# Patient Record
Sex: MALE | Race: WHITE
[De-identification: names, ages, dates, MRNs, and addresses within clinical notes are randomized per-mention and may not be internally consistent; named-entity substitution may affect disease eponyms.]

---

## 2019-07-18 ENCOUNTER — HOSPITAL ENCOUNTER (EMERGENCY)
Dept: HOSPITAL 56 - MW.ED | Age: 29
Discharge: HOME | End: 2019-07-18
Payer: COMMERCIAL

## 2019-07-18 DIAGNOSIS — F17.210: ICD-10-CM

## 2019-07-18 DIAGNOSIS — K04.7: Primary | ICD-10-CM

## 2019-07-18 LAB
CHLORIDE SERPL-SCNC: 104 MMOL/L (ref 98–107)
SODIUM SERPL-SCNC: 142 MMOL/L (ref 136–148)

## 2019-07-18 NOTE — CR
INDICATION:



Pain and swelling



TECHNIQUE:



Soft tissue neck 2 view.



COMPARISON:



None.



FINDINGS:



No prevertebral soft tissue swelling. Epiglottis appears slightly rotated, 

probably within normal limits. Upper lungs unremarkable. Vertebral bodies 

maintain anatomic alignment. No tonsillar enlargement. 



IMPRESSION:



Mild prominence of the epiglottis, favor secondary to slight rotation. 

Clinical correlation. Otherwise, unremarkable soft tissue neck.



Dictated by Miles Kennedy MD @ Jul 18 2019  5:14AM



Signed by Dr. Miles Kennedy @ Jul 18 2019  5:16AM

## 2019-07-18 NOTE — EDM.PDOC
ED HPI GENERAL MEDICAL PROBLEM





- General


Chief Complaint: ENT Problem


Stated Complaint: MOUTH INFECTION


Time Seen by Provider: 07/18/19 05:28





- History of Present Illness


INITIAL COMMENTS - FREE TEXT/NARRATIVE: 





HISTORY AND PHYSICAL:





History of present illness:


Patient's 29-year-old white male presents with a concern of right jaw swelling 

and dental pain he denies fever chills nausea vomiting or other concern





Review of systems: 


As per history of present illness and below otherwise all systems reviewed and 

negative.





Past medical history: 


As per history of present illness and as reviewed below otherwise 

noncontributory.





Surgical history: 


As per history of present illness and as reviewed below otherwise 

noncontributory.





Social history: 


No reported history of drug or alcohol abuse.





Family history: 


As per history of present illness and as reviewed below otherwise 

noncontributory.





Physical exam:


HEENT: Atraumatic, normocephalic, pupils reactive, negative for conjunctival 

pallor or scleral icterus, mucous membranes moist, throat clear, neck supple, 

nontender, trachea midline. Patient noted some gingival edema in the right 

lower molar region. Some slight swelling noted of his right jaw


Lungs: Clear to auscultation, breath sounds equal bilaterally, chest nontender.


Heart: S1S2, regular, negative for clicks, rubs, or JVD.


Abdomen: Soft, nondistended, nontender. Negative for masses or 

hepatosplenomegaly. Negative for costovertebral tenderness.


Pelvis: Stable nontender.


Genitourinary: Deferred.


Rectal: Deferred.


Extremities: Atraumatic, negative for cords or calf pain. Neurovascular 

unremarkable.


Neuro: Awake, alert, oriented. Cranial nerves II through XII unremarkable. 

Cerebellum unremarkable. Motor and sensory unremarkable throughout. Exam 

nonfocal.





Diagnostics:


Soft tissue neck CBC CMP





Therapeutics:


None





Impression: 


#1 dentalgia#2 rule out dental abscess





Definitive disposition and diagnosis as appropriate pending reevaluation and 

review of above.


  ** throat


Pain Score (Numeric/FACES): 9





- Related Data


 Allergies











Allergy/AdvReac Type Severity Reaction Status Date / Time


 


No Known Allergies Allergy   Verified 07/18/19 04:08











Home Meds: 


 Home Meds





. [No Known Home Meds]  07/18/19 [History]











Past Medical History





- Past Health History


Medical/Surgical History: Denies Medical/Surgical History





Social & Family History





- Family History


Family Medical History: Noncontributory





- Tobacco Use


Smoking Status *Q: Current Every Day Smoker


Years of Tobacco use: 5


Packs/Tins Daily: 0.5





- Caffeine Use


Caffeine Use: Reports: Energy Drinks





- Recreational Drug Use


Recreational Drug Use: No





ED ROS GENERAL





- Review of Systems


Review Of Systems: ROS reveals no pertinent complaints other than HPI.





ED EXAM, GENERAL





- Physical Exam


Exam: See Below (See dictation)





Course





- Vital Signs


Last Recorded V/S: 





 Last Vital Signs











Temp  36.4 C   07/18/19 05:24


 


Pulse  102 H  07/18/19 05:24


 


Resp  24 H  07/18/19 05:24


 


BP  182/124 H  07/18/19 05:24


 


Pulse Ox  96   07/18/19 05:24














- Orders/Labs/Meds


Orders: 





 Active Orders 24 hr











 Category Date Time Status


 


 CULTURE STREP A CONFIRMATION [RM] Stat Lab  07/18/19 04:03 Results


 


 STREP SCRN A RAPID W CULT CONF [RM] Stat Lab  07/18/19 04:03 Results











Labs: 





 Laboratory Tests











  07/18/19 07/18/19 Range/Units





  04:40 04:40 


 


WBC  9.02   (4.0-11.0)  K/uL


 


RBC  5.13   (4.50-5.90)  M/uL


 


Hgb  15.5   (13.0-17.0)  g/dL


 


Hct  46.1   (38.0-50.0)  %


 


MCV  89.9   (80.0-98.0)  fL


 


MCH  30.2   (27.0-32.0)  pg


 


MCHC  33.6   (31.0-37.0)  g/dL


 


RDW Std Deviation  40.5   (28.0-62.0)  fl


 


RDW Coeff of Bonnie  12   (11.0-15.0)  %


 


Plt Count  238   (150-400)  K/uL


 


MPV  9.90   (7.40-12.00)  fL


 


Neut % (Auto)  57.0   (48.0-80.0)  %


 


Lymph % (Auto)  26.1   (16.0-40.0)  %


 


Mono % (Auto)  12.3   (0.0-15.0)  %


 


Eos % (Auto)  3.9   (0.0-7.0)  %


 


Baso % (Auto)  0.7   (0.0-1.5)  %


 


Neut # (Auto)  5.2   (1.4-5.7)  K/uL


 


Lymph # (Auto)  2.4   (0.6-2.4)  K/uL


 


Mono # (Auto)  1.1 H   (0.0-0.8)  K/uL


 


Eos # (Auto)  0.4   (0.0-0.7)  K/uL


 


Baso # (Auto)  0.1   (0.0-0.1)  K/uL


 


Nucleated RBC %  0.0   /100WBC


 


Nucleated RBCs #  0   K/uL


 


Sodium   142  (136-148)  mmol/L


 


Potassium   3.9  (3.5-5.1)  mmol/L


 


Chloride   104  ()  mmol/L


 


Carbon Dioxide   29.6  (21.0-32.0)  mmol/L


 


BUN   16  (7.0-18.0)  mg/dL


 


Creatinine   1.1  (0.8-1.3)  mg/dL


 


Est Cr Clr Drug Dosing   110.72  mL/min


 


Estimated GFR (MDRD)   > 60.0  ml/min


 


Glucose   106  ()  mg/dL


 


Calcium   9.1  (8.5-10.1)  mg/dL


 


Total Bilirubin   0.7  (0.2-1.0)  mg/dL


 


AST   98 H  (15-37)  IU/L


 


ALT   290 H  (14-63)  IU/L


 


Alkaline Phosphatase   137 H  ()  U/L


 


Total Protein   7.9  (6.4-8.2)  g/dL


 


Albumin   4.3  (3.4-5.0)  g/dL


 


Globulin   3.6  (2.6-4.0)  g/dL


 


Albumin/Globulin Ratio   1.2  (0.9-1.6)  














Departure





- Departure


Time of Disposition: 05:33


Disposition: Home, Self-Care 01


Condition: Good


Clinical Impression: 


 Dentalgia, Dental abscess








- Discharge Information


Referrals: 


PCP,None [Primary Care Provider] - 


Additional Instructions: 


The following information is given to patients seen in the emergency department 

who are being discharged to home. This information is to outline your options 

for follow-up care. We provide all patients seen in our emergency department 

with a follow-up referral.





The need for follow-up, as well as the timing and circumstances, are variable 

depending upon the specifics of your emergency department visit.





If you don't have a primary care physician on staff, we will provide you with a 

referral. We always advise you to contact your personal physician following an 

emergency department visit to inform them of the circumstance of the visit and 

for follow-up with them and/or the need for any referrals to a consulting 

specialist.





The emergency department will also refer you to a specialist when appropriate. 

This referral assures that you have the opportunity for followup care with a 

specialist. All of these measure are taken in an effort to provide you with 

optimal care, which includes your followup.





Under all circumstances we always encourage you to contact your private 

physician who remains a resource for coordinating  your care. When calling for 

followup care, please make the office aware that this follow-up is from your 

recent emergency room visit. If for any reason you are refused follow-up, 

please contact the [facility] emergency department at [phone] 

and asked to speak to the emergency department charge nurse.




















Augmentin diclofenac as prescribed follow-up dentist as discussed return as 

needed as discussed





- My Orders


Last 24 Hours: 





My Active Orders





07/18/19 04:03


CULTURE STREP A CONFIRMATION [RM] Stat 


STREP SCRN A RAPID W CULT CONF [RM] Stat 














- Assessment/Plan


Last 24 Hours: 





My Active Orders





07/18/19 04:03


CULTURE STREP A CONFIRMATION [RM] Stat 


STREP SCRN A RAPID W CULT CONF [RM] Stat

## 2020-11-13 NOTE — EDM.PDOC
ED HPI GENERAL MEDICAL PROBLEM





- General


Chief Complaint: Respiratory Problem


Stated Complaint: CHEST PAIN SOB


Time Seen by Provider: 11/13/20 13:25


Source of Information: Reports: Patient


History Limitations: Reports: No Limitations





- History of Present Illness


INITIAL COMMENTS - FREE TEXT/NARRATIVE: 


HISTORY AND PHYSICAL:





History of present illness:


Patient is a 30-year-old male who presents to the emergency room with complaints

of chest pain and shortness of breath over the past 3 to 4 hours.  He reports 

last evening he had drank quite a bit of alcohol and may or may not have done 

cocaine.  He states he is not a typical drinker and knows he drink too much and 

does not recall if he had participated in doing drugs.  This morning he woke up 

and felt "okay" but shortly after started to have chest pain and shortness of 

breath.  Patient denies any fever, chills, headache, change in vision, syncope 

or near syncope. Denies any neck/back pain or cough. Denies any abdominal pain, 

nausea, vomiting, diarrhea, constipation or dysuria. Has not noted any blood in 

urine or stool. Patient has been eating and drinking appropriately.





Review of systems: 


As per history of present illness and below otherwise all systems reviewed and 

negative.





Past medical history: 


As per history of present illness and as reviewed below otherwise 

noncontributory.





Surgical history: 


As per history of present illness and as reviewed below otherwise 

noncontributory.





Social history: 


See social history for further information





Family history: 


As per history of present illness and as reviewed below otherwise 

noncontributory.





Physical exam:


General: Well developed and well nourished 30 year old male. Alert and 

orientated x 3. Anxious but nontoxic in appearance and in no acute distress. 

Vital signs have been reviewed by me. Nursing notes were reviewed. 


HEENT: Atraumatic, normocephalic, pupils equal and reactive bilaterally, 

negative for conjunctival pallor or scleral icterus, mucous membranes moist, TMs

normal bilaterally, throat clear, neck supple, nontender, trachea midline. No 

drooling or trismus noted. No meningeal signs. No hot potato voice noted. 


Lungs: Clear to auscultation, breath sounds equal bilaterally, chest nontender. 

Normal work of breathing, no accessory muscles used.


Heart: S1S2, regular rate and rhythm without overt murmur


Abdomen: Soft, nondistended, nontender. Negative for masses or 

hepatosplenomegaly. Negative for costovertebral tenderness.


Skin: Intact, warm, dry. No lesions or rashes noted.


Hematologic: No petechiae or purpra. Mucosa appropriate color and normal nail 

bed color and refill.


Extremities: Atraumatic, moves all extremities per self without difficulty or 

deficits, negative for cords or calf pain. Neurovascular unremarkable.


Neuro: Awake, alert, oriented. Cranial nerves II through XII unremarkable. 

Cerebellum unremarkable. Motor and sensory unremarkable throughout. Exam 

nonfocal.


Psychiatric: Mood and affect are appropriate.  Normal thought process. Answering

questions appropriately.





Notes:


Upon initial evaluation the patient's blood pressure is elevated, he states he 

feels worked up and anxious.  States he does have borderline high blood pressure

although has never needed to take medication or had an actual diagnosis of 

hypertension.





Chest x-ray is unremarkable.  Serum lab work is within normal limits with the 

exception of elevated transaminases. Urine drug screen shows he is positive for 

cocaine.  I have talked with the patient about today's findings, in addition to 

providing specific details for plan of care.  Vital signs have improved and he 

feels much better.  Reassessment at the time of disposition demonstrates that 

the patient is in no acute distress.  The patient is stable for discharge, 

counseling was provided and we discussed in great detail signs and symptoms that

would prompt them to return to the Emergency Department. Medication, follow up 

and supportive care measures were reviewed and discussed. Voices understanding 

and is agreeable to plan of care. Denies any further questions or concerns at 

this time.





Diagnostics:


CBC, CMP, Troponin, EKG, CXR, COVID, Drug Screen, UA





Therapeutics:


IV fluids, Ativan





Prescription:


None





Impression: 


Drug Abuse


Elevated transaminases





Plan:


1. Today your lab work was unremarkable with the exception that your liver 

enzymes are elevated (you should abstain from alcohol use) and have these 

rechecked in a few weeks. Your drug screen shows your positive for Cocaine, 

which is a stimulant and can cause chest pain and SOB. Please avoid ALL 

STIMULANTS until your symptoms have resolved (caffeine, tobacco, alcohol, 

etc...). 


2. Increase your oral fluids, you can alternate Tylenol and ibuprofen as needed 

for pain.


3. We encourage you to follow up with your primary care provider and/or 

recommended specialist in the next few days for re-evaluation and further 

care/management. If your symptoms should worsen, new symptoms develop or any of 

the signs and symptoms we discussed should arise please return to the emergency 

room or call 911 (if needed).





Definitive disposition and diagnosis as appropriate pending reevaluation and 

review of above.





  ** Chest


Pain Score (Numeric/FACES): 5





- Related Data


                                    Allergies











Allergy/AdvReac Type Severity Reaction Status Date / Time


 


No Known Allergies Allergy   Verified 11/13/20 13:28











Home Meds: 


                                    Home Meds





. [No Known Home Meds]  07/18/19 [History]











Past Medical History





- Past Health History


Medical/Surgical History: Denies Medical/Surgical History





- Infectious Disease History


Infectious Disease History: Reports: Chicken Pox





Social & Family History





- Family History


Family Medical History: No Pertinent Family History





- Caffeine Use


Caffeine Use: Reports: Coffee, Energy Drinks





- Recreational Drug Use


Recreational Drug Use: Yes


Drug Use in Last 12 Months: Yes


Recreational Drug Type: Reports: Cocaine


Recreational Drug Use Frequency: Socially





ED ROS GENERAL





- Review of Systems


Review Of Systems: Comprehensive ROS is negative, except as noted in HPI.





ED EXAM, GENERAL





- Physical Exam


Exam: See Below (See dictation)





Course





- Vital Signs


Last Recorded V/S: 


                                Last Vital Signs











Temp  98.8 F   11/13/20 13:29


 


Pulse  109 H  11/13/20 16:14


 


Resp  18   11/13/20 16:14


 


BP  172/129 H  11/13/20 16:14


 


Pulse Ox  98   11/13/20 16:14














- Orders/Labs/Meds


Orders: 


                               Active Orders 24 hr











 Category Date Time Status


 


 EKG Documentation Completion [RC] STAT Care  11/13/20 13:29 Active


 


 CORONAVIRUS COVID-19 PCR [facility] Stat Lab  11/13/20 14:18 Ordered











Labs: 


                                Laboratory Tests











  11/13/20 11/13/20 11/13/20 Range/Units





  13:37 13:37 13:37 


 


WBC  8.15    (4.0-11.0)  K/uL


 


RBC  5.22    (4.50-5.90)  M/uL


 


Hgb  15.9    (13.0-17.0)  g/dL


 


Hct  46.5    (38.0-50.0)  %


 


MCV  89.1    (80.0-98.0)  fL


 


MCH  30.5    (27.0-32.0)  pg


 


MCHC  34.2    (31.0-37.0)  g/dL


 


RDW Std Deviation  39.2    (28.0-62.0)  fl


 


RDW Coeff of Bonnie  12    (11.0-15.0)  %


 


Plt Count  243    (150-400)  K/uL


 


MPV  9.80    (7.40-12.00)  fL


 


Neut % (Auto)  65.6    (48.0-80.0)  %


 


Lymph % (Auto)  23.2    (16.0-40.0)  %


 


Mono % (Auto)  9.8    (0.0-15.0)  %


 


Eos % (Auto)  0.9    (0.0-7.0)  %


 


Baso % (Auto)  0.5    (0.0-1.5)  %


 


Neut # (Auto)  5.4    (1.4-5.7)  K/uL


 


Lymph # (Auto)  1.9    (0.6-2.4)  K/uL


 


Mono # (Auto)  0.8    (0.0-0.8)  K/uL


 


Eos # (Auto)  0.1    (0.0-0.7)  K/uL


 


Baso # (Auto)  0.0    (0.0-0.1)  K/uL


 


Nucleated RBC %  0.0    /100WBC


 


Nucleated RBCs #  0    K/uL


 


D-Dimer, Quantitative   0.34   (0.0-0.50)  mg/L FEU


 


Lactate    1.7  (0.20-2.00)  mmol/L


 


Sodium     (136-148)  mmol/L


 


Potassium     (3.5-5.1)  mmol/L


 


Chloride     ()  mmol/L


 


Carbon Dioxide     (21.0-32.0)  mmol/L


 


BUN     (7.0-18.0)  mg/dL


 


Creatinine     (0.8-1.3)  mg/dL


 


Est Cr Clr Drug Dosing     mL/min


 


Estimated GFR (MDRD)     ml/min


 


Glucose     ()  mg/dL


 


Calcium     (8.5-10.1)  mg/dL


 


Total Bilirubin     (0.2-1.0)  mg/dL


 


AST     (15-37)  IU/L


 


ALT     (14-63)  IU/L


 


Alkaline Phosphatase     ()  U/L


 


Troponin I     (0.000-0.056)  ng/mL


 


Total Protein     (6.4-8.2)  g/dL


 


Albumin     (3.4-5.0)  g/dL


 


Globulin     (2.6-4.0)  g/dL


 


Albumin/Globulin Ratio     (0.9-1.6)  


 


Urine Color     


 


Urine Appearance     


 


Urine pH     (5.0-8.0)  


 


Ur Specific Gravity     (1.001-1.035)  


 


Urine Protein     (NEGATIVE)  mg/dL


 


Urine Glucose (UA)     (NEGATIVE)  mg/dL


 


Urine Ketones     (NEGATIVE)  mg/dL


 


Urine Occult Blood     (NEGATIVE)  


 


Urine Nitrite     (NEGATIVE)  


 


Urine Bilirubin     (NEGATIVE)  


 


Urine Urobilinogen     (<2.0)  EU/dL


 


Ur Leukocyte Esterase     (NEGATIVE)  


 


Urine Opiates Screen     (NEGATIVE)  


 


Ur Oxycodone Screen     (NEGATIVE)  


 


Urine Methadone Screen     (NEGATIVE)  


 


Ur Barbiturates Screen     (NEGATIVE)  


 


Ur Phencyclidine Scrn     (NEGATIVE)  


 


Ur Amphetamine Screen     (NEGATIVE)  


 


U Methamphetamines Scrn     (NEGATIVE)  


 


U Benzodiazepines Scrn     (NEGATIVE)  


 


U Cocaine Metab Screen     (NEGATIVE)  


 


U Marijuana (THC) Screen     (NEGATIVE)  


 


SARS CoV-2 RNA Rapid KEELEY     (NEGATIVE)  














  11/13/20 11/13/20 11/13/20 Range/Units





  13:37 13:41 13:41 


 


WBC     (4.0-11.0)  K/uL


 


RBC     (4.50-5.90)  M/uL


 


Hgb     (13.0-17.0)  g/dL


 


Hct     (38.0-50.0)  %


 


MCV     (80.0-98.0)  fL


 


MCH     (27.0-32.0)  pg


 


MCHC     (31.0-37.0)  g/dL


 


RDW Std Deviation     (28.0-62.0)  fl


 


RDW Coeff of Bonnie     (11.0-15.0)  %


 


Plt Count     (150-400)  K/uL


 


MPV     (7.40-12.00)  fL


 


Neut % (Auto)     (48.0-80.0)  %


 


Lymph % (Auto)     (16.0-40.0)  %


 


Mono % (Auto)     (0.0-15.0)  %


 


Eos % (Auto)     (0.0-7.0)  %


 


Baso % (Auto)     (0.0-1.5)  %


 


Neut # (Auto)     (1.4-5.7)  K/uL


 


Lymph # (Auto)     (0.6-2.4)  K/uL


 


Mono # (Auto)     (0.0-0.8)  K/uL


 


Eos # (Auto)     (0.0-0.7)  K/uL


 


Baso # (Auto)     (0.0-0.1)  K/uL


 


Nucleated RBC %     /100WBC


 


Nucleated RBCs #     K/uL


 


D-Dimer, Quantitative     (0.0-0.50)  mg/L FEU


 


Lactate     (0.20-2.00)  mmol/L


 


Sodium  139    (136-148)  mmol/L


 


Potassium  3.4 L    (3.5-5.1)  mmol/L


 


Chloride  101    ()  mmol/L


 


Carbon Dioxide  24.4    (21.0-32.0)  mmol/L


 


BUN  10    (7.0-18.0)  mg/dL


 


Creatinine  1.0    (0.8-1.3)  mg/dL


 


Est Cr Clr Drug Dosing  120.71    mL/min


 


Estimated GFR (MDRD)  > 60.0    ml/min


 


Glucose  108 H    ()  mg/dL


 


Calcium  9.2    (8.5-10.1)  mg/dL


 


Total Bilirubin  0.8    (0.2-1.0)  mg/dL


 


AST  108 H    (15-37)  IU/L


 


ALT  339 H    (14-63)  IU/L


 


Alkaline Phosphatase  115    ()  U/L


 


Troponin I  < 0.050    (0.000-0.056)  ng/mL


 


Total Protein  8.3 H    (6.4-8.2)  g/dL


 


Albumin  4.5    (3.4-5.0)  g/dL


 


Globulin  3.8    (2.6-4.0)  g/dL


 


Albumin/Globulin Ratio  1.2    (0.9-1.6)  


 


Urine Color   YELLOW   


 


Urine Appearance   CLEAR   


 


Urine pH   5.5   (5.0-8.0)  


 


Ur Specific Gravity   1.010   (1.001-1.035)  


 


Urine Protein   NEGATIVE   (NEGATIVE)  mg/dL


 


Urine Glucose (UA)   NEGATIVE   (NEGATIVE)  mg/dL


 


Urine Ketones   NEGATIVE   (NEGATIVE)  mg/dL


 


Urine Occult Blood   NEGATIVE   (NEGATIVE)  


 


Urine Nitrite   NEGATIVE   (NEGATIVE)  


 


Urine Bilirubin   NEGATIVE   (NEGATIVE)  


 


Urine Urobilinogen   0.2   (<2.0)  EU/dL


 


Ur Leukocyte Esterase   NEGATIVE   (NEGATIVE)  


 


Urine Opiates Screen    NEGATIVE  (NEGATIVE)  


 


Ur Oxycodone Screen    NEGATIVE  (NEGATIVE)  


 


Urine Methadone Screen    NEGATIVE  (NEGATIVE)  


 


Ur Barbiturates Screen    NEGATIVE  (NEGATIVE)  


 


Ur Phencyclidine Scrn    NEGATIVE  (NEGATIVE)  


 


Ur Amphetamine Screen    NEGATIVE  (NEGATIVE)  


 


U Methamphetamines Scrn    NEGATIVE  (NEGATIVE)  


 


U Benzodiazepines Scrn    NEGATIVE  (NEGATIVE)  


 


U Cocaine Metab Screen    POSITIVE  (NEGATIVE)  


 


U Marijuana (THC) Screen    NEGATIVE  (NEGATIVE)  


 


SARS CoV-2 RNA Rapid KEELEY     (NEGATIVE)  














  11/13/20 Range/Units





  14:11 


 


WBC   (4.0-11.0)  K/uL


 


RBC   (4.50-5.90)  M/uL


 


Hgb   (13.0-17.0)  g/dL


 


Hct   (38.0-50.0)  %


 


MCV   (80.0-98.0)  fL


 


MCH   (27.0-32.0)  pg


 


MCHC   (31.0-37.0)  g/dL


 


RDW Std Deviation   (28.0-62.0)  fl


 


RDW Coeff of Bonnie   (11.0-15.0)  %


 


Plt Count   (150-400)  K/uL


 


MPV   (7.40-12.00)  fL


 


Neut % (Auto)   (48.0-80.0)  %


 


Lymph % (Auto)   (16.0-40.0)  %


 


Mono % (Auto)   (0.0-15.0)  %


 


Eos % (Auto)   (0.0-7.0)  %


 


Baso % (Auto)   (0.0-1.5)  %


 


Neut # (Auto)   (1.4-5.7)  K/uL


 


Lymph # (Auto)   (0.6-2.4)  K/uL


 


Mono # (Auto)   (0.0-0.8)  K/uL


 


Eos # (Auto)   (0.0-0.7)  K/uL


 


Baso # (Auto)   (0.0-0.1)  K/uL


 


Nucleated RBC %   /100WBC


 


Nucleated RBCs #   K/uL


 


D-Dimer, Quantitative   (0.0-0.50)  mg/L FEU


 


Lactate   (0.20-2.00)  mmol/L


 


Sodium   (136-148)  mmol/L


 


Potassium   (3.5-5.1)  mmol/L


 


Chloride   ()  mmol/L


 


Carbon Dioxide   (21.0-32.0)  mmol/L


 


BUN   (7.0-18.0)  mg/dL


 


Creatinine   (0.8-1.3)  mg/dL


 


Est Cr Clr Drug Dosing   mL/min


 


Estimated GFR (MDRD)   ml/min


 


Glucose   ()  mg/dL


 


Calcium   (8.5-10.1)  mg/dL


 


Total Bilirubin   (0.2-1.0)  mg/dL


 


AST   (15-37)  IU/L


 


ALT   (14-63)  IU/L


 


Alkaline Phosphatase   ()  U/L


 


Troponin I   (0.000-0.056)  ng/mL


 


Total Protein   (6.4-8.2)  g/dL


 


Albumin   (3.4-5.0)  g/dL


 


Globulin   (2.6-4.0)  g/dL


 


Albumin/Globulin Ratio   (0.9-1.6)  


 


Urine Color   


 


Urine Appearance   


 


Urine pH   (5.0-8.0)  


 


Ur Specific Gravity   (1.001-1.035)  


 


Urine Protein   (NEGATIVE)  mg/dL


 


Urine Glucose (UA)   (NEGATIVE)  mg/dL


 


Urine Ketones   (NEGATIVE)  mg/dL


 


Urine Occult Blood   (NEGATIVE)  


 


Urine Nitrite   (NEGATIVE)  


 


Urine Bilirubin   (NEGATIVE)  


 


Urine Urobilinogen   (<2.0)  EU/dL


 


Ur Leukocyte Esterase   (NEGATIVE)  


 


Urine Opiates Screen   (NEGATIVE)  


 


Ur Oxycodone Screen   (NEGATIVE)  


 


Urine Methadone Screen   (NEGATIVE)  


 


Ur Barbiturates Screen   (NEGATIVE)  


 


Ur Phencyclidine Scrn   (NEGATIVE)  


 


Ur Amphetamine Screen   (NEGATIVE)  


 


U Methamphetamines Scrn   (NEGATIVE)  


 


U Benzodiazepines Scrn   (NEGATIVE)  


 


U Cocaine Metab Screen   (NEGATIVE)  


 


U Marijuana (THC) Screen   (NEGATIVE)  


 


SARS CoV-2 RNA Rapid KEELEY  NEGATIVE  (NEGATIVE)  











Meds: 


Medications














Discontinued Medications














Generic Name Dose Route Start Last Admin





  Trade Name Freq  PRN Reason Stop Dose Admin


 


Sodium Chloride  1,000 mls @ 999 mls/hr  11/13/20 13:33  11/13/20 13:36





  Normal Saline  IV  11/13/20 14:33  999 mls/hr





  STAT ONE   Administration


 


Sodium Chloride  1,000 mls @ 999 mls/hr  11/13/20 15:48 





  Normal Saline  IV  11/13/20 16:48 





  STAT ONE  


 


Lorazepam  1 mg  11/13/20 13:33  11/13/20 13:38





  Ativan  IVPUSH  11/13/20 13:34  1 mg





  ONETIME ONE   Administration


 


Lorazepam  1 mg  11/13/20 15:48  11/13/20 16:12





  Ativan  IVPUSH  11/13/20 15:49  1 mg





  ONETIME ONE   Administration














Departure





- Departure


Time of Disposition: 16:49


Disposition: Home, Self-Care 01


Clinical Impression: 


 Drug abuse, cocaine type, Transaminitis








- Discharge Information


Referrals: 


PCP,None [Primary Care Provider] - 


Forms:  ED Department Discharge


Additional Instructions: 


The following information is given to patients seen in the emergency department 

who are being discharged to home. This information is to outline your options 

for follow-up care. We provide all patients seen in our emergency department 

with a follow-up referral.





The need for follow-up, as well as the timing and circumstances, are variable 

depending upon the specifics of your emergency department visit.





If you don't have a primary care physician on staff, we will provide you with a 

referral. We always advise you to contact your personal physician following an 

emergency department visit to inform them of the circumstance of the visit and 

for follow-up with them and/or the need for any referrals to a consulting 

specialist.





The emergency department will also refer you to a specialist when appropriate. 

This referral assures that you have the opportunity for follow-up care with a 

specialist. All of these measure are taken in an effort to provide you with 

optimal care, which includes your follow-up.





Under all circumstances we always encourage you to contact your private 

physician who remains a resource for coordinating your care. When calling for 

follow-up care, please make the office aware that this follow-up is from your 

recent emergency room visit. If for any reason you are refused follow-up, please

contact the [facility] Emergency Department

at [phone] and asked to speak to the emergency department charge nurse.





[facility]


Primary Care


[address] [place] [id number]


Phone: [phone]


Fax: [phone]





[address] [address]


Phone: [phone]


Fax: [phone]





Thank you for choosing the [facility] emergency department in 

[place] for your medical needs today.  It was a pleasure caring for you. Today

you were seen in the emergency department for chest pain/shortness of breath.





1. Today your lab work was unremarkable with the exception that your liver 

enzymes are elevated (you should abstain from alcohol use) and have these 

rechecked in a few weeks. Your drug screen shows your positive for Cocaine, 

which is a stimulant and can cause chest pain and SOB. Please avoid ALL 

STIMULANTS until your symptoms have resolved (caffeine, tobacco, alcohol, etc...

). 


2. Increase your oral fluids, you can alternate Tylenol and ibuprofen as needed 

for pain.


3. We encourage you to follow up with your primary care provider and/or 

recommended specialist in the next few days for re-evaluation and further 

care/management. If your symptoms should worsen, new symptoms develop or any of 

the signs and symptoms we discussed should arise please return to the emergency 

room or call 911 (if needed).





Sepsis Event Note (ED)





- Evaluation


Sepsis Screening Result: No Definite Risk





- Focused Exam


Vital Signs: 


                                   Vital Signs











  Temp Pulse Resp BP Pulse Ox


 


 11/13/20 16:14   109 H  18  172/129 H  98


 


 11/13/20 14:54   111 H   189/133 H  96


 


 11/13/20 14:24   111 H   212/119 H  96


 


 11/13/20 13:29  98.8 F  124 H  26 H  214/118 H  97














- My Orders


Last 24 Hours: 


My Active Orders





11/13/20 13:29


EKG Documentation Completion [RC] STAT 





11/13/20 14:18


CORONAVIRUS COVID-19 PCR PHL Stat 














- Assessment/Plan


Last 24 Hours: 


My Active Orders





11/13/20 13:29


EKG Documentation Completion [RC] STAT 





11/13/20 14:18


CORONAVIRUS COVID-19 PCR PHL Stat

## 2020-11-13 NOTE — CR
HISTORY:



Chest pain 



COMPARISON:



None available 



FINDINGS:



A portable erect AP view of the chest was obtained at 1402 hours. 



The lungs are clear. No focal or diffuse infiltrates are present.



The heart is normal in size. 



The mediastinum is normal in appearance. 



The osseous structures are normal in appearance for the patient`s age.



IMPRESSION:



Normal portable chest single view.



Dictated by Pb Rausch MD @ Nov 13 2020  2:32PM



Signed by Dr. Pb Rausch @ Nov 13 2020  2:33PM

## 2020-11-13 NOTE — PCM.SN.2
- Free Text/Narrative


Note: 


12-Lead ECG Interpretation 


Acquired: 1:25 PM


Rhythm: Sinus tachycardia


Rate: 124 bpm


Axis: Normal


Intervals: Normal


Ectopy: None


RV Strain: No obvious RV strain pattern.


ST Segments/T-Waves: No notable changes


Acute Ischemic Changes: None apparent


Interpretation: No STEMI